# Patient Record
Sex: FEMALE | Race: WHITE | URBAN - METROPOLITAN AREA
[De-identification: names, ages, dates, MRNs, and addresses within clinical notes are randomized per-mention and may not be internally consistent; named-entity substitution may affect disease eponyms.]

---

## 2021-04-14 ENCOUNTER — OFFICE VISIT (OUTPATIENT)
Dept: URBAN - METROPOLITAN AREA CLINIC 33 | Facility: CLINIC | Age: 81
End: 2021-04-14
Payer: COMMERCIAL

## 2021-04-14 DIAGNOSIS — Z96.1 PRESENCE OF PSEUDOPHAKIA: ICD-10-CM

## 2021-04-14 DIAGNOSIS — H52.4 PRESBYOPIA: Primary | ICD-10-CM

## 2021-04-14 DIAGNOSIS — H43.313 VITREOUS MEMBRANES AND STRANDS, BILATERAL: ICD-10-CM

## 2021-04-14 PROCEDURE — 99204 OFFICE O/P NEW MOD 45 MIN: CPT | Performed by: OPTOMETRIST

## 2021-04-14 ASSESSMENT — VISUAL ACUITY
OD: 20/30
OS: 20/30

## 2021-04-14 ASSESSMENT — INTRAOCULAR PRESSURE
OD: 16
OS: 16

## 2021-04-14 NOTE — IMPRESSION/PLAN
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 Plan: No treatment is required at this time.

## 2021-04-14 NOTE — IMPRESSION/PLAN
Impression: Vitreous membranes and strands, bilateral: H43.313. Plan: Discussed diagnosis in detail with patient. No treatment is required at this time. Advised patient of condition.